# Patient Record
Sex: FEMALE | Race: WHITE | NOT HISPANIC OR LATINO | Employment: STUDENT | URBAN - METROPOLITAN AREA
[De-identification: names, ages, dates, MRNs, and addresses within clinical notes are randomized per-mention and may not be internally consistent; named-entity substitution may affect disease eponyms.]

---
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
## 2024-11-03 ENCOUNTER — HOSPITAL ENCOUNTER (EMERGENCY)
Facility: HOSPITAL | Age: 24
Discharge: HOME | End: 2024-11-03
Attending: EMERGENCY MEDICINE

## 2024-11-03 VITALS
SYSTOLIC BLOOD PRESSURE: 115 MMHG | DIASTOLIC BLOOD PRESSURE: 57 MMHG | WEIGHT: 190 LBS | HEART RATE: 97 BPM | RESPIRATION RATE: 20 BRPM | BODY MASS INDEX: 30.53 KG/M2 | TEMPERATURE: 99 F | HEIGHT: 66 IN | OXYGEN SATURATION: 96 %

## 2024-11-03 DIAGNOSIS — Z53.21 ELOPED FROM EMERGENCY DEPARTMENT: Primary | ICD-10-CM

## 2024-11-03 PROCEDURE — 99281 EMR DPT VST MAYX REQ PHY/QHP: CPT | Mod: ER

## 2024-11-04 NOTE — ED PROVIDER NOTES
Encounter Date: 11/3/2024       History     Chief Complaint   Patient presents with    Dental Pain     Left jaw pain, onset yesterday, now has headache     Patient eloped after triage prior to ER provider evaluation        Review of patient's allergies indicates:   Allergen Reactions    Codeine Hives and Other (See Comments)     hyperactivity    Tylenol-codeine [acetaminophen-codeine] Other (See Comments)     hyperactivity     Past Medical History:   Diagnosis Date    Migraine     Urgency incontinence      Past Surgical History:   Procedure Laterality Date    KNEE SURGERY       Family History   Problem Relation Name Age of Onset    Ovarian cancer Mother      Migraines Father      Breast cancer Other      Ovarian cancer Other      Colon cancer Other       Social History     Tobacco Use    Smoking status: Never    Smokeless tobacco: Never   Substance Use Topics    Alcohol use: No    Drug use: No     Review of Systems    Physical Exam     Initial Vitals [11/03/24 1749]   BP Pulse Resp Temp SpO2   (!) 115/57 97 20 99.1 °F (37.3 °C) 96 %      MAP       --         Physical Exam    ED Course   Procedures  Labs Reviewed   POCT URINE PREGNANCY          Imaging Results    None          Medications - No data to display  Medical Decision Making  Amount and/or Complexity of Data Reviewed  Labs: ordered.               ED Course as of 11/03/24 2010   Sun Nov 03, 2024   1827 BP(!): 115/57 [TM]   1827 Temp: 99.1 °F (37.3 °C) [TM]   1827 Pulse: 97 [TM]   1827 Resp: 20 [TM]   1827 SpO2: 96 % [TM]      ED Course User Index  [TM] Eddie Caruso PA-C                           Clinical Impression:  Final diagnoses:  [Z53.21] Eloped from emergency department (Primary)          ED Disposition Condition    Eloped                 Eddie Caruso PA-C  11/03/24 2010

## 2025-08-31 ENCOUNTER — HOSPITAL ENCOUNTER (EMERGENCY)
Facility: HOSPITAL | Age: 25
Discharge: HOME | End: 2025-08-31
Attending: STUDENT IN AN ORGANIZED HEALTH CARE EDUCATION/TRAINING PROGRAM
Payer: COMMERCIAL

## 2025-08-31 VITALS
HEIGHT: 66 IN | RESPIRATION RATE: 20 BRPM | OXYGEN SATURATION: 98 % | HEART RATE: 76 BPM | SYSTOLIC BLOOD PRESSURE: 120 MMHG | WEIGHT: 190 LBS | DIASTOLIC BLOOD PRESSURE: 75 MMHG | TEMPERATURE: 98 F | BODY MASS INDEX: 30.53 KG/M2

## 2025-08-31 DIAGNOSIS — K59.00 CONSTIPATION: ICD-10-CM

## 2025-08-31 DIAGNOSIS — K29.70 GASTRITIS, PRESENCE OF BLEEDING UNSPECIFIED, UNSPECIFIED CHRONICITY, UNSPECIFIED GASTRITIS TYPE: Primary | ICD-10-CM

## 2025-08-31 LAB
ALBUMIN SERPL-MCNC: 3.9 G/DL (ref 3.3–5.5)
ALBUMIN SERPL-MCNC: 3.9 G/DL (ref 3.3–5.5)
ALP SERPL-CCNC: 69 U/L (ref 42–141)
ALP SERPL-CCNC: 71 U/L (ref 42–141)
B-HCG UR QL: NEGATIVE
BILIRUB SERPL-MCNC: 0.4 MG/DL (ref 0.2–1.6)
BILIRUB SERPL-MCNC: 0.4 MG/DL (ref 0.2–1.6)
BILIRUBIN, POC UA: NEGATIVE
BLOOD, POC UA: NEGATIVE
BUN SERPL-MCNC: 12 MG/DL (ref 7–22)
CALCIUM SERPL-MCNC: 9.7 MG/DL (ref 8–10.3)
CHLORIDE SERPL-SCNC: 110 MMOL/L (ref 98–108)
CLARITY, UA: CLEAR
COLOR, UA: YELLOW
CREAT SERPL-MCNC: 1 MG/DL (ref 0.6–1.2)
CTP QC/QA: YES
GLUCOSE SERPL-MCNC: 84 MG/DL (ref 73–118)
GLUCOSE, POC UA: NEGATIVE
KETONES, POC UA: NEGATIVE
LEUKOCYTE EST, POC UA: NEGATIVE
LIPASE SERPL-CCNC: 28 U/L (ref 4–60)
Lab: 0.9 10 9/L (ref 0.1–1.5)
Lab: 10.5 10 9/L (ref 3.5–10)
Lab: 13.4 G/DL (ref 11.5–16.5)
Lab: 13.6 % (ref 11–16)
Lab: 27.2 PG (ref 25–35)
Lab: 29.9 % (ref 15–50)
Lab: 294 10 9/L (ref 100–400)
Lab: 3.1 10 9/L (ref 0.5–5)
Lab: 33.8 G/DL (ref 31–38)
Lab: 4.91 10 12/L (ref 3.5–5.5)
Lab: 6.5 10 9/L (ref 1.2–8)
Lab: 62.3 % (ref 35–80)
Lab: 7.8 % (ref 2–15)
Lab: 80.5 FL (ref 75–100)
Lab: 9.1 FL (ref 8–11)
NITRITE, POC UA: NEGATIVE
PH UR STRIP: 7.5 [PH] (ref 5–8)
POC ALT (SGPT): 18 U/L (ref 10–47)
POC ALT (SGPT): 19 U/L (ref 10–47)
POC AMYLASE: 774 U/L (ref 14–97)
POC AST (SGOT): 20 U/L (ref 11–38)
POC AST (SGOT): 21 U/L (ref 11–38)
POC GGT: 10 U/L (ref 5–65)
POC HCT: 39.6 % (ref 35–55)
POC TCO2: 30 MMOL/L (ref 18–33)
POTASSIUM BLD-SCNC: 4.4 MMOL/L (ref 3.6–5.1)
PROTEIN, POC UA: NEGATIVE
PROTEIN, POC: 7.3 G/DL (ref 6.4–8.1)
PROTEIN, POC: 7.3 G/DL (ref 6.4–8.1)
SODIUM BLD-SCNC: 140 MMOL/L (ref 128–145)
SPECIFIC GRAVITY, POC UA: 1.02 (ref 1–1.03)
UROBILINOGEN, POC UA: 0.2 E.U./DL

## 2025-08-31 PROCEDURE — 25500020 PHARM REV CODE 255: Mod: ER | Performed by: STUDENT IN AN ORGANIZED HEALTH CARE EDUCATION/TRAINING PROGRAM

## 2025-08-31 PROCEDURE — 81025 URINE PREGNANCY TEST: CPT | Mod: ER

## 2025-08-31 PROCEDURE — 99285 EMERGENCY DEPT VISIT HI MDM: CPT | Mod: 25,ER

## 2025-08-31 PROCEDURE — 63600175 PHARM REV CODE 636 W HCPCS: Mod: ER | Performed by: STUDENT IN AN ORGANIZED HEALTH CARE EDUCATION/TRAINING PROGRAM

## 2025-08-31 PROCEDURE — 81025 URINE PREGNANCY TEST: CPT | Mod: ER | Performed by: STUDENT IN AN ORGANIZED HEALTH CARE EDUCATION/TRAINING PROGRAM

## 2025-08-31 PROCEDURE — 83690 ASSAY OF LIPASE: CPT | Performed by: STUDENT IN AN ORGANIZED HEALTH CARE EDUCATION/TRAINING PROGRAM

## 2025-08-31 PROCEDURE — 96375 TX/PRO/DX INJ NEW DRUG ADDON: CPT | Mod: ER

## 2025-08-31 PROCEDURE — 25000003 PHARM REV CODE 250: Mod: ER | Performed by: STUDENT IN AN ORGANIZED HEALTH CARE EDUCATION/TRAINING PROGRAM

## 2025-08-31 PROCEDURE — 80053 COMPREHEN METABOLIC PANEL: CPT | Mod: ER

## 2025-08-31 PROCEDURE — 82040 ASSAY OF SERUM ALBUMIN: CPT | Mod: XU,ER

## 2025-08-31 PROCEDURE — 96374 THER/PROPH/DIAG INJ IV PUSH: CPT | Mod: ER

## 2025-08-31 PROCEDURE — 96361 HYDRATE IV INFUSION ADD-ON: CPT | Mod: ER

## 2025-08-31 PROCEDURE — 82150 ASSAY OF AMYLASE: CPT | Mod: ER

## 2025-08-31 PROCEDURE — 85025 COMPLETE CBC W/AUTO DIFF WBC: CPT | Mod: ER

## 2025-08-31 RX ORDER — ALUMINUM HYDROXIDE, MAGNESIUM HYDROXIDE, AND SIMETHICONE 1200; 120; 1200 MG/30ML; MG/30ML; MG/30ML
30 SUSPENSION ORAL ONCE
Status: DISCONTINUED | OUTPATIENT
Start: 2025-08-31 | End: 2025-08-31

## 2025-08-31 RX ORDER — ONDANSETRON 4 MG/1
4 TABLET, ORALLY DISINTEGRATING ORAL EVERY 8 HOURS PRN
Qty: 20 TABLET | Refills: 0 | Status: SHIPPED | OUTPATIENT
Start: 2025-08-31

## 2025-08-31 RX ORDER — PANTOPRAZOLE SODIUM 40 MG/1
40 TABLET, DELAYED RELEASE ORAL DAILY
Qty: 30 TABLET | Refills: 0 | Status: SHIPPED | OUTPATIENT
Start: 2025-08-31 | End: 2025-09-30

## 2025-08-31 RX ORDER — MORPHINE SULFATE 4 MG/ML
2 INJECTION, SOLUTION INTRAMUSCULAR; INTRAVENOUS
Status: COMPLETED | OUTPATIENT
Start: 2025-08-31 | End: 2025-08-31

## 2025-08-31 RX ORDER — SUCRALFATE 1 G/10ML
1 SUSPENSION ORAL 4 TIMES DAILY
Qty: 414 ML | Refills: 0 | Status: SHIPPED | OUTPATIENT
Start: 2025-08-31

## 2025-08-31 RX ORDER — ONDANSETRON HYDROCHLORIDE 2 MG/ML
4 INJECTION, SOLUTION INTRAVENOUS
Status: COMPLETED | OUTPATIENT
Start: 2025-08-31 | End: 2025-08-31

## 2025-08-31 RX ORDER — LIDOCAINE HYDROCHLORIDE 20 MG/ML
15 SOLUTION OROPHARYNGEAL ONCE
Status: DISCONTINUED | OUTPATIENT
Start: 2025-08-31 | End: 2025-08-31

## 2025-08-31 RX ORDER — HYOSCYAMINE SULFATE 0.12 MG/1
0.12 TABLET SUBLINGUAL EVERY 4 HOURS PRN
Qty: 28 TABLET | Refills: 0 | Status: SHIPPED | OUTPATIENT
Start: 2025-08-31 | End: 2025-09-07

## 2025-08-31 RX ADMIN — MORPHINE SULFATE 2 MG: 4 INJECTION, SOLUTION INTRAMUSCULAR; INTRAVENOUS at 01:08

## 2025-08-31 RX ADMIN — ONDANSETRON 4 MG: 2 INJECTION INTRAMUSCULAR; INTRAVENOUS at 01:08

## 2025-08-31 RX ADMIN — SODIUM CHLORIDE 1000 ML: 9 INJECTION, SOLUTION INTRAVENOUS at 02:08

## 2025-08-31 RX ADMIN — IOHEXOL 100 ML: 300 INJECTION, SOLUTION INTRAVENOUS at 02:08
